# Patient Record
Sex: MALE | Race: OTHER | Employment: FULL TIME | URBAN - NONMETROPOLITAN AREA
[De-identification: names, ages, dates, MRNs, and addresses within clinical notes are randomized per-mention and may not be internally consistent; named-entity substitution may affect disease eponyms.]

---

## 2017-12-26 ENCOUNTER — OFFICE VISIT (OUTPATIENT)
Dept: PRIMARY CARE CLINIC | Age: 57
End: 2017-12-26

## 2017-12-26 VITALS
WEIGHT: 177 LBS | HEART RATE: 66 BPM | SYSTOLIC BLOOD PRESSURE: 140 MMHG | HEIGHT: 69 IN | DIASTOLIC BLOOD PRESSURE: 70 MMHG | OXYGEN SATURATION: 98 % | BODY MASS INDEX: 26.22 KG/M2 | TEMPERATURE: 98.7 F

## 2017-12-26 DIAGNOSIS — B35.6 TINEA CRURIS: Primary | ICD-10-CM

## 2017-12-26 PROCEDURE — 99202 OFFICE O/P NEW SF 15 MIN: CPT | Performed by: NURSE PRACTITIONER

## 2017-12-26 RX ORDER — HYDROXYZINE 50 MG/1
50 TABLET, FILM COATED ORAL 3 TIMES DAILY PRN
Qty: 30 TABLET | Refills: 0 | Status: SHIPPED | OUTPATIENT
Start: 2017-12-26 | End: 2018-01-05

## 2017-12-26 RX ORDER — CLOTRIMAZOLE 1 %
CREAM (GRAM) TOPICAL
Qty: 60 G | Refills: 1 | Status: SHIPPED | OUTPATIENT
Start: 2017-12-26 | End: 2018-01-02

## 2017-12-26 RX ORDER — TERBINAFINE HYDROCHLORIDE 250 MG/1
250 TABLET ORAL DAILY
Qty: 14 TABLET | Refills: 0 | Status: SHIPPED | OUTPATIENT
Start: 2017-12-26 | End: 2018-01-09

## 2017-12-26 ASSESSMENT — ENCOUNTER SYMPTOMS: RESPIRATORY NEGATIVE: 1

## 2017-12-26 NOTE — PROGRESS NOTES
Subjective:      Patient ID: Pj Mederos is a 62 y.o. male. Rash   This is a chronic problem. The current episode started more than 1 year ago. The problem has been gradually worsening since onset. The affected locations include the groin. The rash is characterized by itchiness. It is unknown if there was an exposure to a precipitant. Treatments tried: Anti-itch ointment. The treatment provided mild relief. Review of Systems   Constitutional: Negative. Respiratory: Negative. Cardiovascular: Negative. Musculoskeletal: Negative. Skin: Positive for rash. Objective:   Physical Exam   Constitutional: He appears well-developed and well-nourished. HENT:   Head: Normocephalic and atraumatic. Right Ear: Tympanic membrane, external ear and ear canal normal.   Left Ear: Tympanic membrane, external ear and ear canal normal.   Nose: Nose normal.   Mouth/Throat: Uvula is midline, oropharynx is clear and moist and mucous membranes are normal.   Eyes: Conjunctivae, EOM and lids are normal. Pupils are equal, round, and reactive to light. Neck: Trachea normal and normal range of motion. Neck supple. Cardiovascular: Normal rate, regular rhythm, normal heart sounds and normal pulses. Pulmonary/Chest: Effort normal and breath sounds normal.   Abdominal: Soft. Bowel sounds are normal.   Musculoskeletal: Normal range of motion. Skin: Skin is warm, dry and intact. There is erythema. Vitals reviewed. Vitals:    12/26/17 1150   BP: (!) 140/70   Pulse: 66   Temp: 98.7 °F (37.1 °C)   SpO2: 98%     I have reviewed pertinent family and social history. Assessment:      1. Tinea cruris  terbinafine (LAMISIL) 250 MG tablet    clotrimazole (LOTRIMIN) 1 % cream    hydrOXYzine (ATARAX) 50 MG tablet           Plan:      May use Hydroxyzine prn itching. Take Lamisil PO daily as directed. Take Clotrimazole and use as directed.   Return to ER or UC for symptoms which worsen or fail to improve. Follow up or establish with PCP for routine health maintenance and monitoring. No Known Allergies    Current Outpatient Prescriptions   Medication Sig Dispense Refill    terbinafine (LAMISIL) 250 MG tablet Take 1 tablet by mouth daily for 14 days 14 tablet 0    clotrimazole (LOTRIMIN) 1 % cream Apply topically 2 times daily. 60 g 1    hydrOXYzine (ATARAX) 50 MG tablet Take 1 tablet by mouth 3 times daily as needed for Itching 30 tablet 0    ibuprofen (ADVIL;MOTRIN) 800 MG tablet Take 1 tablet by mouth every 8 hours as needed for Pain 30 tablet 0     No current facility-administered medications for this visit.

## 2017-12-26 NOTE — PATIENT INSTRUCTIONS
Patient Education        Jock Itch: Care Instructions  Your Care Instructions  Jock itch is a fungal infection of the groin. The fungus that causes jock itch lives on your skin. It often affects male athletes, but anyone can get jock itch. You may get an itchy rash on your inner thighs and rear end (buttocks). It spreads and starts to itch when you sweat or are in steamy showers or locker rooms. Jock itch should end soon if you keep your skin dry after you clean it. You can treat jock itch at home with antifungal creams and powders that you can buy without a prescription. Follow-up care is a key part of your treatment and safety. Be sure to make and go to all appointments, and call your doctor if you are having problems. It's also a good idea to know your test results and keep a list of the medicines you take. How can you care for yourself at home? · Wash the rash with soap and water. · Wet a soft cloth with cool water alone or mixed with baking soda or essential oils such as lavender or xenia. Put the cool compress on the skin to relieve itching. · If you have large areas of blisters or sores, use compresses such as those made with Burow's solution (available without a prescription) to soothe and dry out the blisters. · Spread antifungal cream or powder over, and beyond the edge of, the rash. Follow the directions on the package. · If your doctor prescribed medicine, take it exactly as directed. Call your doctor if you have any problems with your medicine. · Try not to scratch the rash. · Shower or bathe daily and after you exercise. · Keep your skin dry as much as possible to allow it to heal.  · Until your jock itch is cured, wear loose-fitting cotton clothing. Avoid tight underwear, pants, and panty hose. · Wash your supporters and shorts after every wearing. · Do not share clothing, sports equipment, towels, or sheets to avoid spreading the fungi to other people.   To prevent jock itch  · Put on socks before you put on underwear if you have athlete's foot. This action helps prevent the fungus on your feet from spreading to your groin. · Wash your workout clothes, underwear, socks, and towels after each use. · Keep your groin, inner thighs, and buttocks clean and dry, especially after you exercise and shower. · Use a powder on your groin, inner thighs, and buttocks to help keep these areas dry. · Do not borrow or lend clothing, sports equipment, towels, or sheets. · Wear slippers or sandals in locker rooms, showers, and public bathing areas. When should you call for help? Call your doctor now or seek immediate medical care if:  ? · You have signs of infection, such as:  ¨ Increased pain, swelling, warmth, or redness. ¨ Red streaks leading from the rash. ¨ Pus draining from the rash. ¨ A fever. ? Watch closely for changes in your health, and be sure to contact your doctor if:  ? · You do not get better as expected. Where can you learn more? Go to https://SoundOutpeGenometryeweb.[employer]. org and sign in to your [employer] account. Enter G303 in the [employer] box to learn more about \"Jock Itch: Care Instructions. \"     If you do not have an account, please click on the \"Sign Up Now\" link. Current as of: October 13, 2016  Content Version: 11.4  © 6095-8585 [employer]wise, [employer]. Care instructions adapted under license by [facility] ([facility]). If you have questions about a medical condition or this instruction, always ask your healthcare professional. [place] [address] any warranty or liability for your use of this information.

## 2018-07-10 ENCOUNTER — APPOINTMENT (OUTPATIENT)
Dept: CT IMAGING | Age: 58
End: 2018-07-10

## 2018-07-10 ENCOUNTER — HOSPITAL ENCOUNTER (OUTPATIENT)
Age: 58
Setting detail: OBSERVATION
Discharge: HOME | End: 2018-07-11
Attending: EMERGENCY MEDICINE | Admitting: INTERNAL MEDICINE

## 2018-07-10 ENCOUNTER — APPOINTMENT (OUTPATIENT)
Dept: GENERAL RADIOLOGY | Age: 58
End: 2018-07-10

## 2018-07-10 DIAGNOSIS — R07.9 CHEST PAIN, UNSPECIFIED TYPE: Primary | ICD-10-CM

## 2018-07-10 PROBLEM — I10 HYPERTENSION: Status: ACTIVE | Noted: 2018-07-10

## 2018-07-10 PROBLEM — J43.9 EMPHYSEMA/COPD (HCC): Status: ACTIVE | Noted: 2018-07-10

## 2018-07-10 LAB
ABSOLUTE EOS #: 0.2 K/UL (ref 0–0.4)
ABSOLUTE IMMATURE GRANULOCYTE: NORMAL K/UL (ref 0–0.3)
ABSOLUTE LYMPH #: 1.8 K/UL (ref 1–4.8)
ABSOLUTE MONO #: 0.7 K/UL (ref 0.1–1.2)
ANION GAP SERPL CALCULATED.3IONS-SCNC: 13 MMOL/L (ref 9–17)
BASOPHILS # BLD: 1 % (ref 0–2)
BASOPHILS ABSOLUTE: 0.1 K/UL (ref 0–0.2)
BUN BLDV-MCNC: 10 MG/DL (ref 6–20)
BUN/CREAT BLD: 13 (ref 9–20)
CALCIUM SERPL-MCNC: 9.4 MG/DL (ref 8.6–10.4)
CHLORIDE BLD-SCNC: 102 MMOL/L (ref 98–107)
CO2: 23 MMOL/L (ref 20–31)
CREAT SERPL-MCNC: 0.79 MG/DL (ref 0.7–1.2)
D DIMER: 425 NG/ML
DIFFERENTIAL TYPE: NORMAL
EKG ATRIAL RATE: 74 BPM
EKG P AXIS: 55 DEGREES
EKG P-R INTERVAL: 142 MS
EKG Q-T INTERVAL: 378 MS
EKG QRS DURATION: 90 MS
EKG QTC CALCULATION (BAZETT): 419 MS
EKG R AXIS: 14 DEGREES
EKG T AXIS: 52 DEGREES
EKG VENTRICULAR RATE: 74 BPM
EOSINOPHILS RELATIVE PERCENT: 3 % (ref 1–8)
GFR AFRICAN AMERICAN: >60 ML/MIN
GFR NON-AFRICAN AMERICAN: >60 ML/MIN
GFR SERPL CREATININE-BSD FRML MDRD: ABNORMAL ML/MIN/{1.73_M2}
GFR SERPL CREATININE-BSD FRML MDRD: ABNORMAL ML/MIN/{1.73_M2}
GLUCOSE BLD-MCNC: 113 MG/DL (ref 70–99)
HCT VFR BLD CALC: 48.3 % (ref 41–53)
HEMOGLOBIN: 16.4 G/DL (ref 13.5–17.5)
IMMATURE GRANULOCYTES: NORMAL %
INR BLD: 0.9
LV EF: 55 %
LVEF MODALITY: NORMAL
LYMPHOCYTES # BLD: 23 % (ref 15–43)
MCH RBC QN AUTO: 32.5 PG (ref 26–34)
MCHC RBC AUTO-ENTMCNC: 34 G/DL (ref 31–37)
MCV RBC AUTO: 95.8 FL (ref 80–100)
MONOCYTES # BLD: 10 % (ref 6–14)
MYOGLOBIN: <21 NG/ML (ref 28–72)
NRBC AUTOMATED: NORMAL PER 100 WBC
PDW BLD-RTO: 13 % (ref 11–14.5)
PLATELET # BLD: 253 K/UL (ref 140–450)
PLATELET ESTIMATE: NORMAL
PMV BLD AUTO: 8.8 FL (ref 6–12)
POTASSIUM SERPL-SCNC: 4.6 MMOL/L (ref 3.7–5.3)
PROTHROMBIN TIME: 9.9 SEC (ref 9.4–11.3)
RBC # BLD: 5.04 M/UL (ref 4.5–5.9)
RBC # BLD: NORMAL 10*6/UL
SEG NEUTROPHILS: 63 % (ref 44–74)
SEGMENTED NEUTROPHILS ABSOLUTE COUNT: 4.9 K/UL (ref 1.8–7.7)
SODIUM BLD-SCNC: 138 MMOL/L (ref 135–144)
TROPONIN INTERP: NORMAL
TROPONIN T: <0.03 NG/ML
WBC # BLD: 7.7 K/UL (ref 3.5–11)
WBC # BLD: NORMAL 10*3/UL

## 2018-07-10 PROCEDURE — 99220 PR INITIAL OBSERVATION CARE/DAY 70 MINUTES: CPT | Performed by: INTERNAL MEDICINE

## 2018-07-10 PROCEDURE — 71046 X-RAY EXAM CHEST 2 VIEWS: CPT

## 2018-07-10 PROCEDURE — 93005 ELECTROCARDIOGRAM TRACING: CPT

## 2018-07-10 PROCEDURE — 2580000003 HC RX 258: Performed by: INTERNAL MEDICINE

## 2018-07-10 PROCEDURE — 6360000002 HC RX W HCPCS: Performed by: INTERNAL MEDICINE

## 2018-07-10 PROCEDURE — 85025 COMPLETE CBC W/AUTO DIFF WBC: CPT

## 2018-07-10 PROCEDURE — 71260 CT THORAX DX C+: CPT

## 2018-07-10 PROCEDURE — 83874 ASSAY OF MYOGLOBIN: CPT

## 2018-07-10 PROCEDURE — G0378 HOSPITAL OBSERVATION PER HR: HCPCS

## 2018-07-10 PROCEDURE — 85379 FIBRIN DEGRADATION QUANT: CPT

## 2018-07-10 PROCEDURE — 6360000004 HC RX CONTRAST MEDICATION: Performed by: EMERGENCY MEDICINE

## 2018-07-10 PROCEDURE — 96372 THER/PROPH/DIAG INJ SC/IM: CPT

## 2018-07-10 PROCEDURE — 80048 BASIC METABOLIC PNL TOTAL CA: CPT

## 2018-07-10 PROCEDURE — 6370000000 HC RX 637 (ALT 250 FOR IP): Performed by: INTERNAL MEDICINE

## 2018-07-10 PROCEDURE — 84484 ASSAY OF TROPONIN QUANT: CPT

## 2018-07-10 PROCEDURE — 94760 N-INVAS EAR/PLS OXIMETRY 1: CPT

## 2018-07-10 PROCEDURE — 99243 OFF/OP CNSLTJ NEW/EST LOW 30: CPT | Performed by: INTERNAL MEDICINE

## 2018-07-10 PROCEDURE — 93306 TTE W/DOPPLER COMPLETE: CPT

## 2018-07-10 PROCEDURE — 36415 COLL VENOUS BLD VENIPUNCTURE: CPT

## 2018-07-10 PROCEDURE — 99285 EMERGENCY DEPT VISIT HI MDM: CPT

## 2018-07-10 PROCEDURE — 6370000000 HC RX 637 (ALT 250 FOR IP): Performed by: EMERGENCY MEDICINE

## 2018-07-10 PROCEDURE — 85610 PROTHROMBIN TIME: CPT

## 2018-07-10 RX ORDER — ACETAMINOPHEN 325 MG/1
650 TABLET ORAL EVERY 4 HOURS PRN
Status: DISCONTINUED | OUTPATIENT
Start: 2018-07-10 | End: 2018-07-11 | Stop reason: HOSPADM

## 2018-07-10 RX ORDER — SODIUM CHLORIDE 0.9 % (FLUSH) 0.9 %
10 SYRINGE (ML) INJECTION EVERY 12 HOURS SCHEDULED
Status: DISCONTINUED | OUTPATIENT
Start: 2018-07-10 | End: 2018-07-11 | Stop reason: HOSPADM

## 2018-07-10 RX ORDER — TRAMADOL HYDROCHLORIDE 50 MG/1
50 TABLET ORAL EVERY 6 HOURS PRN
Status: DISCONTINUED | OUTPATIENT
Start: 2018-07-10 | End: 2018-07-11 | Stop reason: HOSPADM

## 2018-07-10 RX ORDER — LISINOPRIL 2.5 MG/1
2.5 TABLET ORAL DAILY
Status: DISCONTINUED | OUTPATIENT
Start: 2018-07-11 | End: 2018-07-11 | Stop reason: HOSPADM

## 2018-07-10 RX ORDER — ASPIRIN 81 MG/1
324 TABLET, CHEWABLE ORAL ONCE
Status: COMPLETED | OUTPATIENT
Start: 2018-07-10 | End: 2018-07-10

## 2018-07-10 RX ORDER — NITROGLYCERIN 0.4 MG/1
0.4 TABLET SUBLINGUAL EVERY 5 MIN PRN
Status: DISCONTINUED | OUTPATIENT
Start: 2018-07-10 | End: 2018-07-11 | Stop reason: HOSPADM

## 2018-07-10 RX ORDER — SODIUM CHLORIDE 0.9 % (FLUSH) 0.9 %
10 SYRINGE (ML) INJECTION PRN
Status: DISCONTINUED | OUTPATIENT
Start: 2018-07-10 | End: 2018-07-11 | Stop reason: HOSPADM

## 2018-07-10 RX ORDER — NICOTINE 21 MG/24HR
1 PATCH, TRANSDERMAL 24 HOURS TRANSDERMAL DAILY
Status: DISCONTINUED | OUTPATIENT
Start: 2018-07-10 | End: 2018-07-11 | Stop reason: HOSPADM

## 2018-07-10 RX ORDER — METOPROLOL TARTRATE 5 MG/5ML
2.5 INJECTION INTRAVENOUS PRN
Status: DISCONTINUED | OUTPATIENT
Start: 2018-07-10 | End: 2018-07-11 | Stop reason: HOSPADM

## 2018-07-10 RX ADMIN — Medication 10 ML: at 13:29

## 2018-07-10 RX ADMIN — ENOXAPARIN SODIUM 80 MG: 80 INJECTION SUBCUTANEOUS at 13:27

## 2018-07-10 RX ADMIN — ASPIRIN 81 MG 324 MG: 81 TABLET ORAL at 09:14

## 2018-07-10 RX ADMIN — Medication 10 ML: at 20:56

## 2018-07-10 RX ADMIN — TRAMADOL HYDROCHLORIDE 50 MG: 50 TABLET, FILM COATED ORAL at 20:19

## 2018-07-10 RX ADMIN — ENOXAPARIN SODIUM 80 MG: 80 INJECTION SUBCUTANEOUS at 20:55

## 2018-07-10 RX ADMIN — IOPAMIDOL 80 ML: 755 INJECTION, SOLUTION INTRAVENOUS at 10:31

## 2018-07-10 ASSESSMENT — PAIN DESCRIPTION - DESCRIPTORS
DESCRIPTORS: ACHING;SORE
DESCRIPTORS: SORE
DESCRIPTORS: SORE

## 2018-07-10 ASSESSMENT — ENCOUNTER SYMPTOMS
BLOOD IN STOOL: 0
VOMITING: 0
TROUBLE SWALLOWING: 0
WHEEZING: 0
NAUSEA: 0
ABDOMINAL PAIN: 0
DIARRHEA: 0
CONSTIPATION: 0
BACK PAIN: 0
SHORTNESS OF BREATH: 1
SORE THROAT: 0

## 2018-07-10 ASSESSMENT — PAIN DESCRIPTION - PAIN TYPE
TYPE: ACUTE PAIN
TYPE: ACUTE PAIN

## 2018-07-10 ASSESSMENT — PAIN DESCRIPTION - LOCATION
LOCATION: CHEST;RIB CAGE

## 2018-07-10 ASSESSMENT — PAIN DESCRIPTION - DIRECTION: RADIATING_TOWARDS: NECK

## 2018-07-10 ASSESSMENT — PAIN SCALES - GENERAL
PAINLEVEL_OUTOF10: 4

## 2018-07-10 ASSESSMENT — PAIN DESCRIPTION - ORIENTATION
ORIENTATION: ANTERIOR
ORIENTATION: ANTERIOR

## 2018-07-10 NOTE — H&P
HOSPITALIST ADMISSION H&P      REASON FOR ADMISSION:  Chest pain  ESTIMATED LENGTH OF STAY:  < 2 midnight---1-2 days    ATTENDING/ADMITTING PHYSICIAN: Teresa Kasper MD  PCP: No primary care provider on file. HISTORY OF PRESENT ILLNESS:      The patient is a 62 y.o. male patient of No primary care provider on file. 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 Patient [id number]-available records reviewed, including, but not limited to, ER reports--labs--imaging---EKG--office records--personal notes         Past Medical History:   Diagnosis Date    Tobacco abuse            History reviewed. No pertinent surgical history. Medications Prior to Admission:    Prescriptions Prior to Admission: ibuprofen (ADVIL;MOTRIN) 800 MG tablet, Take 1 tablet by mouth every 8 hours as needed for Pain    Allergies:    Patient has no known allergies. Social History:    reports that he has been smoking. He has a 35.00 pack-year smoking history. He has never used smokeless tobacco. He reports that he drinks alcohol. He reports that he uses drugs, including Marijuana. Family History:   family history is not on file. REVIEW OF SYSTEMS:  See HPI and problem list; otherwise no other new complaints with respect to HEENT, neck, pulmonary, coronary, GI, , endocrine, musculoskeletal, immune system/connective tissue disease, hematologic, neuropsych, skin, lymphatics, or malignancies.      PHYSICAL EXAM:  Vitals:  BP (!) 172/84   Pulse 62   Temp 98.9 °F (37.2 °C) (Tympanic)   Resp 18 Ht 5' 9\" (1.753 m)   Wt 172 lb 13.5 oz (78.4 kg)   SpO2 98%   BMI 25.52 kg/m²     HEENT: Mucosa Pink, Moist, Normocephalic and Atraumatic  Neck: Supple, Carotid Pulses Present, No Bruits, No Masses, Tenderness, Nodularity and No Lymphadenopathy  Chest/Lungs: Clear to Auscultation without Rales, Rhonchi, or Wheezes     Chest wall:  Mild tenderness to palpation left chest  Cardiac: Regular Rate and Rhythm without Rubs, Clicks, Gallops, or Murmurs  GI/Abdomen:  Bowel Sounds Present, Soft, Non-tender, without Guarding or Rebound Tenderness, No Masses and No Tenderness  : Not examined  EXT/Skin: No Edema, No Cyanosis and No Clubbing  Neuro: Alert and Oriented, to Person, to Time, to Place, to Situation and No Localizing Signs/Symptoms        LABS:    CBC with Differential:    Lab Results   Component Value Date    WBC 7.7 07/10/2018    RBC 5.04 07/10/2018    HGB 16.4 07/10/2018    HCT 48.3 07/10/2018     07/10/2018    MCV 95.8 07/10/2018    MCH 32.5 07/10/2018    MCHC 34.0 07/10/2018    RDW 13.0 07/10/2018    LYMPHOPCT 23 07/10/2018    MONOPCT 10 07/10/2018    BASOPCT 1 07/10/2018    MONOSABS 0.70 07/10/2018    LYMPHSABS 1.80 07/10/2018    EOSABS 0.20 07/10/2018    BASOSABS 0.10 07/10/2018    DIFFTYPE NOT REPORTED 07/10/2018     BMP:    Lab Results   Component Value Date     07/10/2018    K 4.6 07/10/2018     07/10/2018    CO2 23 07/10/2018    BUN 10 07/10/2018    CREATININE 0.79 07/10/2018    CALCIUM 9.4 07/10/2018    GFRAA >60 07/10/2018    LABGLOM >60 07/10/2018    GLUCOSE 113 07/10/2018     Troponin:  No results found for: TROPONINI    ASSESSMENT:      Patient Active Problem List   Diagnosis    Tobacco abuse    Chest pain   JOSÉ LUIS--FLORENTINO YANCEY md           ximena  world call  62  WM   [world call;   [facility]--[facility]]  FULL CODE       LOVENOX     Chest pain----7.10.2018           EKG---7.10.2018---NSR--74---LAE  Elevated d-dimer---7.10.2018          CTA chest--7.10.2018---no

## 2018-07-10 NOTE — ED PROVIDER NOTES
[facility]  eMERGENCY dEPARTMENT eNCOUnter      Pt Name: Nancy Umana  MRN: [id number]  [place] 1960  Date of evaluation: 7/10/2018      CHIEF COMPLAINT       Chief Complaint   Patient presents with    Chest Pain     \"for a few weeks\"         HISTORY OF PRESENT ILLNESS    Nancy Umana is a 62 y.o. male who presents But chiefly the chest pain is substernal says he feels the bones ache but is also deeper pain medicine seems to bring on Advil seems to ease up somewhat although he said with activities goes he gets along more winded than usual he got winded yesterday mowing the yard. There's been no leg swelling no fevers no chills the pain radiates into his neck. He did have a recent vacation and drove to a [facility]   He is a smoker    REVIEW OF SYSTEMS         Review of Systems   Constitutional: Negative for chills and fever. HENT: Negative for congestion, dental problem, sore throat and trouble swallowing. Eyes: Negative for visual disturbance. Respiratory: Positive for shortness of breath. Negative for wheezing. Cardiovascular: Positive for chest pain. Negative for palpitations and leg swelling. Gastrointestinal: Negative for abdominal pain, blood in stool, constipation, diarrhea, nausea and vomiting. Genitourinary: Negative for difficulty urinating, dysuria and testicular pain. Musculoskeletal: Negative for back pain, joint swelling and neck pain. Skin: Negative for rash. Neurological: Negative for dizziness, syncope, weakness and headaches. Hematological: Negative for adenopathy. Does not bruise/bleed easily. Psychiatric/Behavioral: Negative for confusion and suicidal ideas. PAST MEDICAL HISTORY    has a past medical history of Tobacco abuse. SURGICAL HISTORY      has no past surgical history on file.     CURRENT MEDICATIONS       Previous Medications    IBUPROFEN (ADVIL;MOTRIN) 800 MG TABLET    Take 1 tablet by mouth every 8 hours as on file       (Please note that portions of this note were completed with a voice recognition program.  Efforts were made to edit the dictations but occasionally words are mis-transcribed.)    Pablo MD, F.A.A.E.M.   Attending Emergency Physician                            Romelia Celestin MD  07/10/18 5230

## 2018-07-10 NOTE — CONSULTS
[facility] Cardiology Cardiology    Consult                        Today's Date: 7/10/2018  Patient Name: Micky Nogueira  Date of admission: 7/10/2018  8:35 AM  Patient's age: 62 y.o., 1960  Admission Dx: Chest pain [R07.9]    Reason for Consult:  Cardiac evaluation    Requesting Physician: Parag He    CHIEF COMPLAINT:  Chest pain    History Obtained From:  patient    HISTORY OF PRESENT ILLNESS:      The patient is a 62 y.o.  male who is admitted to the hospital for chest pain which started 2 weeks ago. He started a job as  for last 6 months. His job requires lifting very heavy objects. His brothers help him. Patient reports that advil helps pain and when not takes advil, pain is worse. He went to [facility] a week ago. BP on arrival was 197/101. He denies any FH of CAD. He is a chronic smoker. ECG did not show any acute changes. CT chest did not show any PE. Pulmonary emphysema and fibrotic changes noted. Trop x 2 negative. Echocardiogram today showed preserved LV systolic function and mild aortic valve insufficiency. Past Medical History:   has a past medical history of Tobacco abuse. Past Surgical History:   has no past surgical history on file. Home Medications:    Prior to Admission medications    Medication Sig Start Date End Date Taking? Authorizing Provider   ibuprofen (ADVIL;MOTRIN) 800 MG tablet Take 1 tablet by mouth every 8 hours as needed for Pain 8/30/16  Yes SUSANNA Lima       Allergies:  Patient has no known allergies. Social History:   reports that he has been smoking. He has a 35.00 pack-year smoking history. He has never used smokeless tobacco. He reports that he drinks alcohol. He reports that he uses drugs, including Marijuana. Family History: family history is not on file. No h/o sudden cardiac death. No for premature CAD    REVIEW OF SYSTEMS:    · Constitutional: there has been no unanticipated weight loss.  There's been No change in energy level,

## 2018-07-11 ENCOUNTER — TELEPHONE (OUTPATIENT)
Dept: FAMILY MEDICINE CLINIC | Age: 58
End: 2018-07-11

## 2018-07-11 VITALS
SYSTOLIC BLOOD PRESSURE: 145 MMHG | HEIGHT: 69 IN | RESPIRATION RATE: 18 BRPM | BODY MASS INDEX: 25.6 KG/M2 | HEART RATE: 88 BPM | DIASTOLIC BLOOD PRESSURE: 82 MMHG | OXYGEN SATURATION: 93 % | TEMPERATURE: 98.1 F | WEIGHT: 172.84 LBS

## 2018-07-11 LAB
ABSOLUTE EOS #: 0.3 K/UL (ref 0–0.4)
ABSOLUTE IMMATURE GRANULOCYTE: NORMAL K/UL (ref 0–0.3)
ABSOLUTE LYMPH #: 1.6 K/UL (ref 1–4.8)
ABSOLUTE MONO #: 0.6 K/UL (ref 0.1–1.2)
ANION GAP SERPL CALCULATED.3IONS-SCNC: 9 MMOL/L (ref 9–17)
BASOPHILS # BLD: 1 % (ref 0–2)
BASOPHILS ABSOLUTE: 0.1 K/UL (ref 0–0.2)
BUN BLDV-MCNC: 10 MG/DL (ref 6–20)
BUN/CREAT BLD: 11 (ref 9–20)
CALCIUM SERPL-MCNC: 9.8 MG/DL (ref 8.6–10.4)
CHLORIDE BLD-SCNC: 101 MMOL/L (ref 98–107)
CHOLESTEROL/HDL RATIO: 3.3
CHOLESTEROL: 160 MG/DL
CO2: 29 MMOL/L (ref 20–31)
CREAT SERPL-MCNC: 0.93 MG/DL (ref 0.7–1.2)
DIFFERENTIAL TYPE: NORMAL
EKG ATRIAL RATE: 60 BPM
EKG P AXIS: 62 DEGREES
EKG P-R INTERVAL: 142 MS
EKG Q-T INTERVAL: 414 MS
EKG QRS DURATION: 106 MS
EKG QTC CALCULATION (BAZETT): 414 MS
EKG R AXIS: 25 DEGREES
EKG T AXIS: 48 DEGREES
EKG VENTRICULAR RATE: 60 BPM
EOSINOPHILS RELATIVE PERCENT: 4 % (ref 1–8)
GFR AFRICAN AMERICAN: >60 ML/MIN
GFR NON-AFRICAN AMERICAN: >60 ML/MIN
GFR SERPL CREATININE-BSD FRML MDRD: ABNORMAL ML/MIN/{1.73_M2}
GFR SERPL CREATININE-BSD FRML MDRD: ABNORMAL ML/MIN/{1.73_M2}
GLUCOSE BLD-MCNC: 109 MG/DL (ref 70–99)
HCT VFR BLD CALC: 48.4 % (ref 41–53)
HDLC SERPL-MCNC: 48 MG/DL
HEMOGLOBIN: 16.7 G/DL (ref 13.5–17.5)
IMMATURE GRANULOCYTES: NORMAL %
LDL CHOLESTEROL: 76 MG/DL (ref 0–130)
LYMPHOCYTES # BLD: 25 % (ref 15–43)
MCH RBC QN AUTO: 32.9 PG (ref 26–34)
MCHC RBC AUTO-ENTMCNC: 34.5 G/DL (ref 31–37)
MCV RBC AUTO: 95.6 FL (ref 80–100)
MONOCYTES # BLD: 10 % (ref 6–14)
NRBC AUTOMATED: NORMAL PER 100 WBC
PDW BLD-RTO: 12.9 % (ref 11–14.5)
PLATELET # BLD: 265 K/UL (ref 140–450)
PLATELET ESTIMATE: NORMAL
PMV BLD AUTO: 8.5 FL (ref 6–12)
POTASSIUM SERPL-SCNC: 5 MMOL/L (ref 3.7–5.3)
RBC # BLD: 5.06 M/UL (ref 4.5–5.9)
RBC # BLD: NORMAL 10*6/UL
SEG NEUTROPHILS: 60 % (ref 44–74)
SEGMENTED NEUTROPHILS ABSOLUTE COUNT: 4 K/UL (ref 1.8–7.7)
SODIUM BLD-SCNC: 139 MMOL/L (ref 135–144)
TRIGL SERPL-MCNC: 180 MG/DL
TROPONIN INTERP: NORMAL
TROPONIN INTERP: NORMAL
TROPONIN T: <0.03 NG/ML
TROPONIN T: <0.03 NG/ML
VLDLC SERPL CALC-MCNC: ABNORMAL MG/DL (ref 1–30)
WBC # BLD: 6.6 K/UL (ref 3.5–11)
WBC # BLD: NORMAL 10*3/UL

## 2018-07-11 PROCEDURE — 80061 LIPID PANEL: CPT

## 2018-07-11 PROCEDURE — 6370000000 HC RX 637 (ALT 250 FOR IP): Performed by: NURSE PRACTITIONER

## 2018-07-11 PROCEDURE — G0378 HOSPITAL OBSERVATION PER HR: HCPCS

## 2018-07-11 PROCEDURE — 84484 ASSAY OF TROPONIN QUANT: CPT

## 2018-07-11 PROCEDURE — 99217 PR OBSERVATION CARE DISCHARGE MANAGEMENT: CPT | Performed by: INTERNAL MEDICINE

## 2018-07-11 PROCEDURE — 85025 COMPLETE CBC W/AUTO DIFF WBC: CPT

## 2018-07-11 PROCEDURE — 80048 BASIC METABOLIC PNL TOTAL CA: CPT

## 2018-07-11 PROCEDURE — 6360000002 HC RX W HCPCS: Performed by: INTERNAL MEDICINE

## 2018-07-11 PROCEDURE — 93005 ELECTROCARDIOGRAM TRACING: CPT

## 2018-07-11 PROCEDURE — 96372 THER/PROPH/DIAG INJ SC/IM: CPT

## 2018-07-11 PROCEDURE — 2580000003 HC RX 258: Performed by: INTERNAL MEDICINE

## 2018-07-11 PROCEDURE — 36415 COLL VENOUS BLD VENIPUNCTURE: CPT

## 2018-07-11 RX ORDER — HYDROCODONE BITARTRATE AND ACETAMINOPHEN 5; 325 MG/1; MG/1
1 TABLET ORAL EVERY 4 HOURS PRN
Status: DISCONTINUED | OUTPATIENT
Start: 2018-07-11 | End: 2018-07-11 | Stop reason: HOSPADM

## 2018-07-11 RX ORDER — NICOTINE 21 MG/24HR
1 PATCH, TRANSDERMAL 24 HOURS TRANSDERMAL DAILY
Qty: 30 PATCH | Refills: 0 | COMMUNITY
Start: 2018-07-12

## 2018-07-11 RX ORDER — ACETAMINOPHEN 325 MG/1
650 TABLET ORAL EVERY 4 HOURS PRN
Qty: 120 TABLET | Refills: 0 | COMMUNITY
Start: 2018-07-11

## 2018-07-11 RX ORDER — HYDROCODONE BITARTRATE AND ACETAMINOPHEN 5; 325 MG/1; MG/1
1 TABLET ORAL EVERY 4 HOURS PRN
Qty: 10 TABLET | Refills: 0 | Status: SHIPPED | OUTPATIENT
Start: 2018-07-11 | End: 2018-07-18

## 2018-07-11 RX ORDER — LISINOPRIL 2.5 MG/1
2.5 TABLET ORAL DAILY
Qty: 30 TABLET | Refills: 0 | Status: SHIPPED | OUTPATIENT
Start: 2018-07-12 | End: 2018-07-18 | Stop reason: SDUPTHER

## 2018-07-11 RX ADMIN — ENOXAPARIN SODIUM 80 MG: 80 INJECTION SUBCUTANEOUS at 08:10

## 2018-07-11 RX ADMIN — HYDROCODONE BITARTRATE AND ACETAMINOPHEN 1 TABLET: 5; 325 TABLET ORAL at 06:27

## 2018-07-11 RX ADMIN — LISINOPRIL 2.5 MG: 2.5 TABLET ORAL at 08:10

## 2018-07-11 RX ADMIN — Medication 10 ML: at 08:10

## 2018-07-11 ASSESSMENT — PAIN SCALES - GENERAL
PAINLEVEL_OUTOF10: 8
PAINLEVEL_OUTOF10: 7
PAINLEVEL_OUTOF10: 5

## 2018-07-11 ASSESSMENT — PAIN DESCRIPTION - PAIN TYPE: TYPE: ACUTE PAIN

## 2018-07-11 ASSESSMENT — PAIN DESCRIPTION - LOCATION: LOCATION: CHEST

## 2018-07-11 NOTE — PLAN OF CARE
Problem: SAFETY  Goal: Free from accidental physical injury  Outcome: Ongoing    Goal: Free from intentional harm  Outcome: Ongoing      Problem: DAILY CARE  Goal: Daily care needs are met  Outcome: Ongoing      Problem: PAIN  Goal: Patient's pain/discomfort is manageable  Outcome: Ongoing      Problem: SKIN INTEGRITY  Goal: Skin integrity is maintained or improved  Outcome: Ongoing      Problem: Infection:  Goal: Will remain free from infection  Will remain free from infection   Outcome: Ongoing      Problem: Safety:  Goal: Free from accidental physical injury  Free from accidental physical injury   Outcome: Ongoing    Goal: Free from intentional harm  Free from intentional harm   Outcome: Ongoing      Problem: Daily Care:  Goal: Daily care needs are met  Daily care needs are met   Outcome: Ongoing      Problem: Pain:  Goal: Patient's pain/discomfort is manageable  Patient's pain/discomfort is manageable   Outcome: Ongoing    Goal: Pain level will decrease  Pain level will decrease   Outcome: Ongoing    Goal: Control of acute pain  Control of acute pain   Outcome: Ongoing    Goal: Control of chronic pain  Control of chronic pain   Outcome: Ongoing      Problem: Skin Integrity:  Goal: Skin integrity will stabilize  Skin integrity will stabilize   Outcome: Ongoing      Problem: Discharge Planning:  Goal: Patients continuum of care needs are met  Patients continuum of care needs are met   Outcome: Ongoing      Problem: Cardiac Output - Decreased:  Goal: Hemodynamic stability will improve  Hemodynamic stability will improve   Outcome: Ongoing

## 2018-07-11 NOTE — PROGRESS NOTES
Hospitalist Progress Note    Patient:  Renetta Sainz     YOB: 1960    MRN: [id number]   Admit date: 7/10/2018     Acct: [de-identified]     PCP: No primary care provider on file.     CC--Interval History: Chest pain----MI ruled out---seen by Cardiology----Home---7.11.2018---outpatient stress testing    Elevated d-dimer--CTA chest---no PE    Tobacco abuse---advised to quit----nicotine patch    See note below     All other ROS negative except noted in HPI    Diet:  DIET CARDIAC;    Medications:  Scheduled Meds:   sodium chloride flush  10 mL Intravenous 2 times per day    enoxaparin  1 mg/kg Subcutaneous BID    nicotine  1 patch Transdermal Daily    lisinopril  2.5 mg Oral Daily     Continuous Infusions:  PRN Meds:HYDROcodone 5 mg - acetaminophen, sodium chloride flush, acetaminophen, nitroGLYCERIN, metoprolol, traMADol    Objective:  Labs:  CBC with Differential:    Lab Results   Component Value Date    WBC 6.6 07/11/2018    RBC 5.06 07/11/2018    HGB 16.7 07/11/2018    HCT 48.4 07/11/2018     07/11/2018    MCV 95.6 07/11/2018    MCH 32.9 07/11/2018    MCHC 34.5 07/11/2018    RDW 12.9 07/11/2018    LYMPHOPCT 25 07/11/2018    MONOPCT 10 07/11/2018    BASOPCT 1 07/11/2018    MONOSABS 0.60 07/11/2018    LYMPHSABS 1.60 07/11/2018    EOSABS 0.30 07/11/2018    BASOSABS 0.10 07/11/2018    DIFFTYPE NOT REPORTED 07/11/2018     BMP:    Lab Results   Component Value Date     07/11/2018    K 5.0 07/11/2018     07/11/2018    CO2 29 07/11/2018    BUN 10 07/11/2018    CREATININE 0.93 07/11/2018    CALCIUM 9.8 07/11/2018    GFRAA >60 07/11/2018    LABGLOM >60 07/11/2018    GLUCOSE 109 07/11/2018     Last 3 Troponin:  No results found for: TROPONINI        Physical Exam:  Vitals: BP (!) 145/82   Pulse 88   Temp 98.1 °F (36.7 °C) (Tympanic)   Resp 18   Ht 5' 9\" (1.753 m)   Wt 172 lb 13.5 oz (78.4 kg)   SpO2 93%   BMI 25.52 kg/m²   24 hour intake/output:  Intake/Output Summary (Last 24 hours)

## 2018-07-11 NOTE — PROGRESS NOTES
Hospitalist Progress Note    Patient:  Donte Dee     YOB: 1960    MRN: [id number]   Admit date: 7/10/2018     Acct: [de-identified]     PCP: No primary care provider on file. CC--Interval History: Chest pain--patient states chest pain remains present in bilateral chest tonight throughout \"entire rib cage\"--is reproducible with palpation--denies nausea or dyspnea       Seen by cardiology--started low dose lisinopril for hypertension--recommending stress test OP      Smoker--CT chest showing emphysema and fibrotic changes    All other ROS negative except noted in HPI    Diet:  DIET CARDIAC;    Medications:  Scheduled Meds:   sodium chloride flush  10 mL Intravenous 2 times per day    enoxaparin  1 mg/kg Subcutaneous BID    nicotine  1 patch Transdermal Daily    [START ON 7/11/2018] lisinopril  2.5 mg Oral Daily     Continuous Infusions:  PRN Meds:sodium chloride flush, acetaminophen, nitroGLYCERIN, metoprolol, traMADol    Objective:      Physical Exam:  Vitals: BP (!) 150/83   Pulse 55   Temp 98.6 °F (37 °C) (Tympanic)   Resp 18   Ht 5' 9\" (1.753 m)   Wt 172 lb 13.5 oz (78.4 kg)   SpO2 98%   BMI 25.52 kg/m²   24 hour intake/output:  Intake/Output Summary (Last 24 hours) at 07/10/18 2305  Last data filed at 07/10/18 1400   Gross per 24 hour   Intake              200 ml   Output                0 ml   Net              200 ml       General: awake, alert and cooperative  HEENT: Mucosa Pink, Moist, External nose normal, Normocephalic and Atraumatic  Neck: Supple, No Masses, Tenderness, Nodularity and No Lymphadenopathy  Chest/Lungs: chest wall tenderness with palpation L>R, Clear to Auscultation without Rales, Rhonchi, or Wheezes and Respirations even and unlabored  Cardiac: Regular Rate and Rhythm and Pedal Pulses Palpable Bilaterally  GI/Abdomen:  Bowel Sounds Present and Soft, Non-tender, without Guarding or Rebound Tenderness  : Not examined  Extremities/Musculoskeletal: All four

## 2018-07-18 ENCOUNTER — OFFICE VISIT (OUTPATIENT)
Dept: FAMILY MEDICINE CLINIC | Age: 58
End: 2018-07-18

## 2018-07-18 VITALS
SYSTOLIC BLOOD PRESSURE: 128 MMHG | RESPIRATION RATE: 16 BRPM | WEIGHT: 175 LBS | DIASTOLIC BLOOD PRESSURE: 72 MMHG | HEART RATE: 80 BPM | HEIGHT: 69 IN | OXYGEN SATURATION: 98 % | BODY MASS INDEX: 25.92 KG/M2

## 2018-07-18 DIAGNOSIS — I10 ESSENTIAL HYPERTENSION: ICD-10-CM

## 2018-07-18 DIAGNOSIS — R07.89 CHEST WALL PAIN: Primary | ICD-10-CM

## 2018-07-18 DIAGNOSIS — R07.9 CHEST PAIN, UNSPECIFIED TYPE: ICD-10-CM

## 2018-07-18 DIAGNOSIS — Z72.0 TOBACCO ABUSE: ICD-10-CM

## 2018-07-18 PROCEDURE — 99214 OFFICE O/P EST MOD 30 MIN: CPT | Performed by: FAMILY MEDICINE

## 2018-07-18 RX ORDER — LISINOPRIL 2.5 MG/1
2.5 TABLET ORAL DAILY
Qty: 90 TABLET | Refills: 1 | Status: SHIPPED | OUTPATIENT
Start: 2018-07-18

## 2018-07-18 ASSESSMENT — PATIENT HEALTH QUESTIONNAIRE - PHQ9
SUM OF ALL RESPONSES TO PHQ QUESTIONS 1-9: 0
2. FEELING DOWN, DEPRESSED OR HOPELESS: 0
1. LITTLE INTEREST OR PLEASURE IN DOING THINGS: 0
SUM OF ALL RESPONSES TO PHQ9 QUESTIONS 1 & 2: 0

## 2018-07-18 NOTE — PATIENT INSTRUCTIONS
smoking, you improve the health of everyone who now breathes in your smoke. · Their heart, lung, and cancer risks drop, much like yours. · They are sick less. For babies and small children, living smoke-free means they're less likely to have ear infections, pneumonia, and bronchitis. · If you're a woman who is or will be pregnant someday, quitting smoking means a healthier . · Children who are close to you are less likely to become adult smokers. Where can you learn more? Go to https://PowerWise HoldingspeQuietly.[employer] [address] [employer]. org and sign in to your [employer] account. Enter [phone] in the [facility] box to learn more about \"Learning About Benefits From Quitting Smoking. \"     If you do not have an account, please click on the \"Sign Up Now\" link. Current as of: 2017  Content Version: 11.6  © 3290-4984 [employer]. Care instructions adapted under license by [facility] ([facility]). If you have questions about a medical condition or this instruction, always ask your healthcare professional. [address] any warranty or liability for your use of this information.

## 2019-02-27 ENCOUNTER — APPOINTMENT (OUTPATIENT)
Dept: GENERAL RADIOLOGY | Age: 59
End: 2019-02-27
Payer: COMMERCIAL

## 2019-02-27 ENCOUNTER — HOSPITAL ENCOUNTER (EMERGENCY)
Age: 59
Discharge: HOME | End: 2019-02-27
Attending: SPECIALIST
Payer: COMMERCIAL

## 2019-02-27 VITALS
DIASTOLIC BLOOD PRESSURE: 82 MMHG | TEMPERATURE: 98.2 F | HEART RATE: 96 BPM | SYSTOLIC BLOOD PRESSURE: 140 MMHG | HEIGHT: 70 IN | RESPIRATION RATE: 18 BRPM | WEIGHT: 170 LBS | BODY MASS INDEX: 24.34 KG/M2 | OXYGEN SATURATION: 99 %

## 2019-02-27 DIAGNOSIS — M19.011 BILATERAL SHOULDER REGION ARTHRITIS: ICD-10-CM

## 2019-02-27 DIAGNOSIS — M19.012 BILATERAL SHOULDER REGION ARTHRITIS: ICD-10-CM

## 2019-02-27 DIAGNOSIS — R07.89 CHEST WALL PAIN: Primary | ICD-10-CM

## 2019-02-27 LAB
EKG ATRIAL RATE: 85 BPM
EKG P AXIS: 62 DEGREES
EKG P-R INTERVAL: 150 MS
EKG Q-T INTERVAL: 366 MS
EKG QRS DURATION: 102 MS
EKG QTC CALCULATION (BAZETT): 435 MS
EKG R AXIS: 24 DEGREES
EKG T AXIS: 49 DEGREES
EKG VENTRICULAR RATE: 85 BPM
TROPONIN INTERP: NORMAL
TROPONIN T: NORMAL NG/ML
TROPONIN, HIGH SENSITIVITY: <6 NG/L (ref 0–22)

## 2019-02-27 PROCEDURE — 73030 X-RAY EXAM OF SHOULDER: CPT

## 2019-02-27 PROCEDURE — 36415 COLL VENOUS BLD VENIPUNCTURE: CPT

## 2019-02-27 PROCEDURE — 93005 ELECTROCARDIOGRAM TRACING: CPT

## 2019-02-27 PROCEDURE — 6370000000 HC RX 637 (ALT 250 FOR IP): Performed by: SPECIALIST

## 2019-02-27 PROCEDURE — 71046 X-RAY EXAM CHEST 2 VIEWS: CPT

## 2019-02-27 PROCEDURE — 84484 ASSAY OF TROPONIN QUANT: CPT

## 2019-02-27 PROCEDURE — 99284 EMERGENCY DEPT VISIT MOD MDM: CPT

## 2019-02-27 RX ORDER — ASPIRIN 81 MG/1
324 TABLET, CHEWABLE ORAL ONCE
Status: COMPLETED | OUTPATIENT
Start: 2019-02-27 | End: 2019-02-27

## 2019-02-27 RX ORDER — NAPROXEN 500 MG/1
500 TABLET ORAL 2 TIMES DAILY
Qty: 15 TABLET | Refills: 0 | Status: SHIPPED | OUTPATIENT
Start: 2019-02-27

## 2019-02-27 RX ADMIN — ASPIRIN 81 MG 324 MG: 81 TABLET ORAL at 10:48

## 2019-02-27 ASSESSMENT — PAIN SCALES - GENERAL: PAINLEVEL_OUTOF10: 10

## 2019-02-27 ASSESSMENT — ENCOUNTER SYMPTOMS
SHORTNESS OF BREATH: 0
COUGH: 0
BACK PAIN: 0

## 2019-02-27 ASSESSMENT — PAIN DESCRIPTION - DESCRIPTORS: DESCRIPTORS: CONSTANT

## 2019-02-27 ASSESSMENT — PAIN DESCRIPTION - PAIN TYPE: TYPE: ACUTE PAIN;CHRONIC PAIN

## 2019-11-18 ENCOUNTER — OFFICE VISIT (OUTPATIENT)
Dept: FAMILY MEDICINE CLINIC | Age: 59
End: 2019-11-18

## 2019-11-18 DIAGNOSIS — I10 ESSENTIAL HYPERTENSION: ICD-10-CM

## 2019-11-18 DIAGNOSIS — J43.9 PULMONARY EMPHYSEMA, UNSPECIFIED EMPHYSEMA TYPE (HCC): ICD-10-CM

## 2019-11-18 DIAGNOSIS — Z23 NEED FOR VACCINATION: Primary | ICD-10-CM

## 2019-11-18 DIAGNOSIS — Z12.11 SCREENING FOR COLON CANCER: ICD-10-CM

## 2022-05-18 ENCOUNTER — HOSPITAL ENCOUNTER (OUTPATIENT)
Dept: GENERAL RADIOLOGY | Age: 62
Discharge: HOME | End: 2022-05-20
Payer: MEDICARE

## 2022-05-18 ENCOUNTER — OFFICE VISIT (OUTPATIENT)
Dept: PRIMARY CARE CLINIC | Age: 62
End: 2022-05-18
Payer: MEDICARE

## 2022-05-18 VITALS
HEART RATE: 68 BPM | SYSTOLIC BLOOD PRESSURE: 122 MMHG | WEIGHT: 176 LBS | DIASTOLIC BLOOD PRESSURE: 90 MMHG | TEMPERATURE: 96.6 F | HEIGHT: 69 IN | BODY MASS INDEX: 26.07 KG/M2 | OXYGEN SATURATION: 97 %

## 2022-05-18 DIAGNOSIS — K59.00 CONSTIPATION, UNSPECIFIED CONSTIPATION TYPE: Primary | ICD-10-CM

## 2022-05-18 DIAGNOSIS — R14.0 BLOATING: ICD-10-CM

## 2022-05-18 PROCEDURE — 99212 OFFICE O/P EST SF 10 MIN: CPT | Performed by: NURSE PRACTITIONER

## 2022-05-18 PROCEDURE — 74018 RADEX ABDOMEN 1 VIEW: CPT

## 2022-05-18 PROCEDURE — 99214 OFFICE O/P EST MOD 30 MIN: CPT | Performed by: NURSE PRACTITIONER

## 2022-05-18 RX ORDER — CALCIUM POLYCARBOPHIL 625 MG 625 MG/1
TABLET ORAL
Qty: 60 TABLET | Refills: 4 | COMMUNITY
Start: 2022-05-18

## 2022-05-18 ASSESSMENT — PATIENT HEALTH QUESTIONNAIRE - PHQ9
2. FEELING DOWN, DEPRESSED OR HOPELESS: 0
SUM OF ALL RESPONSES TO PHQ QUESTIONS 1-9: 0
SUM OF ALL RESPONSES TO PHQ9 QUESTIONS 1 & 2: 0
SUM OF ALL RESPONSES TO PHQ QUESTIONS 1-9: 0
1. LITTLE INTEREST OR PLEASURE IN DOING THINGS: 0
SUM OF ALL RESPONSES TO PHQ QUESTIONS 1-9: 0
SUM OF ALL RESPONSES TO PHQ QUESTIONS 1-9: 0

## 2022-05-18 ASSESSMENT — ENCOUNTER SYMPTOMS
FLATUS: 1
RESPIRATORY NEGATIVE: 1
RECTAL PAIN: 0
ABDOMINAL PAIN: 0
DIARRHEA: 0
BLOATING: 1
CONSTIPATION: 1
BLOOD IN STOOL: 0
NAUSEA: 0
VOMITING: 0

## 2022-05-18 NOTE — PROGRESS NOTES
[facility]             [address], [address]                        Telephone [phone]             Fax [phone]     Yolis Cruz  1960  [facility]:[id number]   Date of visit:  5/18/2022    Subjective:    Yolis Cruz is a 58 y.o.  male who presents to [facility] today (5/18/2022) for evaluation of:    Chief Complaint   Patient presents with    Constipation     pt is having touble having BM's. sx began 3-4 months ago       Constipation  This is a new problem. The current episode started more than 1 month ago (X 3-4 months). The problem has been gradually worsening since onset. His stool frequency is 2 to 3 times per week. The stool is described as watery and formed (small amounts). The patient is not on a high fiber diet. He does not exercise regularly. There has not been adequate water intake (2-3 16 oz bottles/day). Associated symptoms include bloating and flatus. Pertinent negatives include no abdominal pain, diarrhea, difficulty urinating, fever, hemorrhoids, nausea, rectal pain or vomiting. Associated symptoms comments: Last bowel movement this morning. Treatments tried: tried metamucil for 1 month. The treatment provided mild relief. He has the following problem list:  Patient Active Problem List   Diagnosis    Tobacco abuse    Chest pain    Hypertension    Emphysema/COPD [facility])        Current medications are:  Current Outpatient Medications   Medication Sig Dispense Refill    polycarbophil (FIBERCON) 625 MG tablet Take 2 tabs daily with 8 ounces of water.  60 tablet 4    acetaminophen (TYLENOL) 325 MG tablet Take 2 tablets by mouth every 4 hours as needed for Pain or Fever 120 tablet 0    naproxen (NAPROSYN) 500 MG tablet Take 1 tablet by mouth 2 times daily (Patient not taking: Reported on 5/18/2022) 15 tablet 0    lisinopril (PRINIVIL;ZESTRIL) 2.5 MG tablet Take 1 tablet by mouth daily (Patient not taking: Reported on 5/18/2022) 90 tablet 1    nicotine (NICODERM CQ) 21 MG/24HR Place 1 patch onto the skin daily (Patient not taking: Reported on 5/18/2022) 30 patch 0     No current facility-administered medications for this visit. He has No Known Allergies. Nevroseline Stands He  reports that he has been smoking. He has a 35.00 pack-year smoking history. He has never used smokeless tobacco.      Objective:    Vitals:    05/18/22 0910   BP: (!) 122/90   Site: Left Upper Arm   Position: Sitting   Cuff Size: Medium Adult   Pulse: 68   Temp: 96.6 °F (35.9 °C)   TempSrc: Tympanic   SpO2: 97%   Weight: 176 lb (79.8 kg)   Height: 5' 9\" (1.753 m)     Body mass index is 25.99 kg/m². Review of Systems   Constitutional: Negative. Negative for fever. Respiratory: Negative. Cardiovascular: Negative. Gastrointestinal: Positive for bloating, constipation and flatus. Negative for abdominal pain, blood in stool, diarrhea, hemorrhoids, nausea, rectal pain and vomiting. Genitourinary: Negative for difficulty urinating. Physical Exam  Vitals and nursing note reviewed. Constitutional:       Appearance: He is well-developed. HENT:      Head: Normocephalic. Eyes:      Pupils: Pupils are equal, round, and reactive to light. Cardiovascular:      Rate and Rhythm: Normal rate and regular rhythm. Heart sounds: Normal heart sounds. Pulmonary:      Effort: Pulmonary effort is normal.      Breath sounds: Normal breath sounds and air entry. Abdominal:      General: Abdomen is flat. Bowel sounds are increased. Palpations: Abdomen is soft. Tenderness: There is no abdominal tenderness. Musculoskeletal:      Cervical back: Normal range of motion and neck supple. Skin:     General: Skin is warm and dry. Neurological:      General: No focal deficit present. Mental Status: He is alert and oriented to person, place, and time.    Psychiatric:         Behavior: Behavior normal.         Thought Content: Thought content normal.       Assessment and Plan:    XR ABDOMEN (KUB) (SINGLE AP VIEW)    Result Date: 5/18/2022  EXAMINATION: ONE SUPINE XRAY VIEW(S) OF THE ABDOMEN 5/18/2022 9:41 am COMPARISON: None. HISTORY: ORDERING SYSTEM PROVIDED HISTORY: Bloating TECHNOLOGIST PROVIDED HISTORY: concern for constipation with increased bloating and small infrequent bowel movements Reason for Exam: Abdominal distention; constipation FINDINGS: Nonobstructive bowel gas pattern. There is moderate stool burden in the descending colon. No suspicious calcifications. Degenerative disc disease at L4/5. Nonobstructive bowel gas pattern. Moderate stool burden in the descending colon        Diagnosis Orders   1. Constipation, unspecified constipation type  polycarbophil (FIBERCON) 625 MG tablet   2. Bloating  XR ABDOMEN (KUB) (SINGLE AP VIEW)     I reviewed radiology result with patient during visit. Take polycarbophil as directed. Increase water intake. Increase fresh fruit and vegetables. Exercise 15 minutes daily. Follow up with PCP if symptoms persist or worsen. The use, risks, benefits, and side effects of prescribed or recommended medications were discussed. All questions were answered and the patient/caregiver voiced understanding. No orders of the defined types were placed in this encounter.         Electronically signed by JANES Shay CNP on 5/18/22 at 9:19 AM EDT

## 2022-05-18 NOTE — PATIENT INSTRUCTIONS
Patient Education        Constipation: Care Instructions  Overview     Constipation means that you have a hard time passing stools (bowel movements). People pass stools from 3 times a day to once every 3 days. What is normal for you may be different. Constipation may occur with pain in the rectum and cramping. The pain may get worse when you try to pass stools. Sometimes there are small amounts of bright red blood on toilet paper or the surface of stools. This is because of enlarged veins near the rectum (hemorrhoids). A few changes in your diet and lifestyle may help you avoid ongoingconstipation. Your doctor may also prescribe medicine to help loosen your stool. Some medicines can cause constipation. These include pain medicines and antidepressants. Tell your doctor about all the medicines you take. Your doctormay want to make a medicine change to ease your symptoms. Follow-up care is a key part of your treatment and safety. Be sure to make and go to all appointments, and call your doctor if you are having problems. It's also a good idea to know your test results and keep alist of the medicines you take. How can you care for yourself at home?  Drink plenty of fluids. If you have kidney, heart, or liver disease and have to limit fluids, talk with your doctor before you increase the amount of fluids you drink.  Include high-fiber foods in your diet each day. These include fruits, vegetables, beans, and whole grains.  Get at least 30 minutes of exercise on most days of the week. Walking is a good choice. You also may want to do other activities, such as running, swimming, cycling, or playing tennis or team sports.  Take a fiber supplement, such as Citrucel or Metamucil, every day. Read and follow all instructions on the label.  Schedule time each day for a bowel movement. A daily routine may help. Take your time having a bowel movement, but don't sit for more than 10 minutes at a time.  And don't strain too much.  Support your feet with a small step stool when you sit on the toilet. This helps flex your hips and places your pelvis in a squatting position.  Your doctor may recommend an over-the-counter laxative to relieve your constipation. Examples are Milk of Magnesia and MiraLax. Read and follow all instructions on the label. Do not use laxatives on a long-term basis. When should you call for help? Call your doctor now or seek immediate medical care if:     You have new or worse belly pain.      You have new or worse nausea or vomiting.      You have blood in your stools. Watch closely for changes in your health, and be sure to contact your doctor if:     Your constipation is getting worse.      You do not get better as expected. Where can you learn more? Go to https://[employer]. org and sign in to your [employer] account. Enter 21 [phone] in the S B E box to learn more about \"Constipation: Care Instructions. \"     If you do not have an account, please click on the \"Sign Up Now\" link. Current as of: July 1, 2021               Content Version: 13.2  © 0771-1930 [employer]. Care instructions adapted under license by [facility] ([facility]). If you have questions about a medical condition or this instruction, always ask your healthcare professional. [address] any warranty or liability for your use of this information.